# Patient Record
Sex: MALE | Race: ASIAN | NOT HISPANIC OR LATINO | URBAN - METROPOLITAN AREA
[De-identification: names, ages, dates, MRNs, and addresses within clinical notes are randomized per-mention and may not be internally consistent; named-entity substitution may affect disease eponyms.]

---

## 2018-09-18 ENCOUNTER — OFFICE VISIT (OUTPATIENT)
Dept: URBAN - METROPOLITAN AREA CLINIC 45 | Facility: CLINIC | Age: 43
End: 2018-09-18
Payer: COMMERCIAL

## 2018-09-18 DIAGNOSIS — H40.011 OPEN ANGLE WITH BORDERLINE FINDINGS, LOW RISK, RIGHT EYE: Primary | ICD-10-CM

## 2018-09-18 PROCEDURE — 76514 ECHO EXAM OF EYE THICKNESS: CPT | Performed by: OPHTHALMOLOGY

## 2018-09-18 PROCEDURE — 92002 INTRM OPH EXAM NEW PATIENT: CPT | Performed by: OPHTHALMOLOGY

## 2018-09-18 PROCEDURE — 92133 CPTRZD OPH DX IMG PST SGM ON: CPT | Performed by: OPHTHALMOLOGY

## 2018-09-18 ASSESSMENT — INTRAOCULAR PRESSURE
OD: 9
OS: 9

## 2018-09-18 NOTE — IMPRESSION/PLAN
Impression: Open angle with borderline findings, low risk, right eye: H40.011. /524
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 Plan: Discussed risk of vision loss with glaucoma and need for close f/u. IOP reasonable for Bogdan Powell 73 appearance OU. OCT ordered and discussed with patient.  Recommend 1-2 year IOP check and dilated exam.